# Patient Record
Sex: MALE | URBAN - METROPOLITAN AREA
[De-identification: names, ages, dates, MRNs, and addresses within clinical notes are randomized per-mention and may not be internally consistent; named-entity substitution may affect disease eponyms.]

---

## 2023-06-28 ASSESSMENT — SLEEP AND FATIGUE QUESTIONNAIRES
HOW LIKELY ARE YOU TO NOD OFF OR FALL ASLEEP WHILE SITTING AND READING: MODERATE CHANCE OF DOZING
HOW LIKELY ARE YOU TO NOD OFF OR FALL ASLEEP IN A CAR, WHILE STOPPED FOR A FEW MINUTES IN TRAFFIC: SLIGHT CHANCE OF DOZING
HOW LIKELY ARE YOU TO NOD OFF OR FALL ASLEEP WHILE LYING DOWN TO REST IN THE AFTERNOON WHEN CIRCUMSTANCES PERMIT: MODERATE CHANCE OF DOZING
HOW LIKELY ARE YOU TO NOD OFF OR FALL ASLEEP WHILE SITTING AND TALKING TO SOMEONE: SLIGHT CHANCE OF DOZING
HOW LIKELY ARE YOU TO NOD OFF OR FALL ASLEEP WHILE WATCHING TV: SLIGHT CHANCE OF DOZING
HOW LIKELY ARE YOU TO NOD OFF OR FALL ASLEEP WHEN YOU ARE A PASSENGER IN A CAR FOR AN HOUR WITHOUT A BREAK: SLIGHT CHANCE OF DOZING
HOW LIKELY ARE YOU TO NOD OFF OR FALL ASLEEP WHILE SITTING QUIETLY AFTER LUNCH WITHOUT ALCOHOL: HIGH CHANCE OF DOZING
HOW LIKELY ARE YOU TO NOD OFF OR FALL ASLEEP WHILE SITTING INACTIVE IN A PUBLIC PLACE: MODERATE CHANCE OF DOZING

## 2023-06-29 ENCOUNTER — OFFICE VISIT (OUTPATIENT)
Dept: SLEEP MEDICINE | Facility: CLINIC | Age: 28
End: 2023-06-29
Payer: COMMERCIAL

## 2023-06-29 VITALS
BODY MASS INDEX: 28.87 KG/M2 | HEIGHT: 75 IN | HEART RATE: 72 BPM | SYSTOLIC BLOOD PRESSURE: 135 MMHG | OXYGEN SATURATION: 100 % | DIASTOLIC BLOOD PRESSURE: 85 MMHG | WEIGHT: 232.2 LBS

## 2023-06-29 DIAGNOSIS — R29.818 SUSPECTED SLEEP APNEA: Primary | ICD-10-CM

## 2023-06-29 DIAGNOSIS — R40.0 SLEEPINESS: ICD-10-CM

## 2023-06-29 DIAGNOSIS — R06.83 SNORING: ICD-10-CM

## 2023-06-29 PROCEDURE — 99203 OFFICE O/P NEW LOW 30 MIN: CPT | Performed by: INTERNAL MEDICINE

## 2023-06-29 RX ORDER — CETIRIZINE HYDROCHLORIDE 10 MG/1
TABLET ORAL DAILY
COMMUNITY

## 2023-06-29 NOTE — NURSING NOTE
"Chief Complaint   Patient presents with     Sleep Problem     Snoring, stops breathing , dry mouth       Initial /85   Pulse 72   Ht 1.905 m (6' 3\")   Wt 105.3 kg (232 lb 3.2 oz)   SpO2 100%   BMI 29.02 kg/m   Estimated body mass index is 29.02 kg/m  as calculated from the following:    Height as of this encounter: 1.905 m (6' 3\").    Weight as of this encounter: 105.3 kg (232 lb 3.2 oz).    Medication Reconciliation: complete  ESS 13  Neck circumference:43  centimeters.  Barbara Rojas MA      "

## 2023-06-29 NOTE — PROGRESS NOTES
Sleep Consultation:    Date on this visit: 6/29/2023    Felipe John  is referred by No ref. provider found for a sleep consultation.     Primary Physician: No primary care provider on file.     Felipe John reports nightly loud snoring for more than 10 years.     He does not have any medical comorbidity.     Felipe does snore frequently. Patient's bed partner does complain of snoring and poor quality of sleep. He has been told in the past that he has witnessed apneas.They occasionally sleep separately.  Patient sleeps on his side and stomach. He has occasional morning headaches and frequent morning dry mouth, denies no restless legs.    Felipe goes to sleep at 10:30 PM during the week. He wakes up at 6:30 AM. He falls asleep in 15-30 minutes.  Felipe denies difficulty falling asleep.  He wakes up 0-1 times a night for 5 minutes before falling back to sleep.  Felipe wakes up to go to the bathroom and uncertain reasons.  On weekends, Felipe goes to sleep at 11:00 PM.  He wakes up at 8:00 AM. He falls asleep in 30 minutes.  Patient gets an average of 7-8 hours of sleep per night.     Felipe has occasional sleep talking and denies any sleep walking, dream enactment, sleep paralysis, cataplexy and hypnogogic/hypnopompic hallucinations. There is a remote history of sleep walking.     Felipe has difficulty breathing through his nose.      Patient's Windsor Sleepiness score 13/24 consistent with mild daytime sleepiness.      Felipe naps 0 times per week. He takes some inadvertant naps.  He denies closing eyes, dozing and falling asleep while driving. Patient was counseled on the importance of driving while alert, to pull over if drowsy, or nap before getting into the vehicle if sleepy.      He uses 1 cups/day of coffee. Last caffeine intake is usually before 8 AM.    Social History     Tobacco Use     Smoking status: Never     Smokeless tobacco: Current     Types: Snuff       Physical  "Examination:  Vitals: /85   Pulse 72   Ht 1.905 m (6' 3\")   Wt 105.3 kg (232 lb 3.2 oz)   SpO2 100%   BMI 29.02 kg/m    BMI= Body mass index is 29.02 kg/m .  GENERAL APPEARANCE: healthy, alert and no distress  HENT: oropharynx crowded  NEURO: mentation intact and speech normal  PSYCH: mentation appears normal and affect normal/bright  Mallampati Class: IV.  Tonsillar Stage: 1  hidden by pillars.    Impression/Plan:    1. To rule out obstructive sleep apnea    Patient is a 28 years old male, with a BMI of 29, with no significant medical comorbidity, who presents with a history of loud snoring, nonrestorative sleep and daytime sleepiness.  Oropharynx is Mallampati class IV on examination.  There is an intermediate risk for obstructive sleep apnea, and an overnight sleep study is recommended for further evaluation.    Plan:     1.  Home sleep apnea testing    He will follow up with me in approximately two weeks after his sleep study has been competed to review the results and discuss plan of care.       Polysomnography & HST reviewed.  Obstructive sleep apnea reviewed.  Complications of untreated sleep apnea were reviewed.    I spent a total of 30 minutes for this appointment on this date of service which include time spent before, during and after the visit for chart review, patient care, counseling and coordination of care.    Dr. Alexx Avelar       CC: No ref. provider found            "

## 2023-06-29 NOTE — PATIENT INSTRUCTIONS
[facility]  Search engine: https://mms.aadsm.org/members/directory/search_bootstrap.php?org_id=ADSM&   Certified in Dental Sleep Medicine    Casey Baum  Degree: DDS  [facility]  [address]  [address]  [place] [address]  Professional Phone: [phone]: [phone]    Priti Sparks  [facility]  [address]  [place] [address]  Professional Phone: [phone]  Website: [email or web address]      Jose Espino  Degree: DDS  [address]  [address]  [place] [address]  Professional Phone: [phone]  Fax: [phone]    Josué Bonilla  Degree: DDS  [address]  [address]  [place] [address]  Professional Phone: [phone]  Website: [email or web address]    Joseph Swanson  Degree: DDS  [facility]  [address]  [place] [address]  Professional Phone: [phone]  Website: [email or web address]      [facility]-you should verify insurance coverage  [address]  [address]  [place] [address]  Professional Phone: [phone]  Website: [email or web address]      Rita Pineda--DOES NOT ACCEPT INSURANCE  Degree: DDS--you should verify insurance coverage  [facility]  [address]  [address]  [place] [address]  Professional Phone: [phone]     Bev Kwong  Degree: DDS, PhD  [facility]  [address] [address] [place] [address] [address] [place] [address]   [address],   [address]   [place] [address]   Appointments: [phone]   Fax: [phone]   [facility]   [address] [place] [address]   [address]   [place] [address]   Appointments: [phone]   Fax: [phone]                José  Bassam  Degree: LEIGHA  [address] [place] [address]  Professional Phone: [phone]  Fax: [phone]  Website: [email or web address]      Du Khan  Degree: DDS  [facility]  [address] [place] [address]    Mariona Mulet Pradera  Degree: LEIGHA, MS  [facility]  [address] [place] [address]  Professional Phone: [phone]      Randi Merino  Degree: LEIGHA, MS  [facility]  [address]  [address]  [place] [address]  Professional Phone: [phone]    Melissa Macias  Degree: JILLIANS  [facility]  [address]  [address]  [place] [address]  [place] Office     Cody Evangelista  Degree: LEIGHA  [facility]  [address] [place] [address]  Professional Phone: [phone]  Website: [email or web address]      Daljit White  Degree: LEIGHA  [facility]  [address] [place] [address]  Professional Phone: [phone]  Fax: [phone]      Rohit Coyle  Degree: LEIGHA  [facility]  [address]  [address]  [place] [address]    Allan Xavier   Degree: LEIGHA   [facility]   [address]   [address]  [place] [address]  Phone [phone]  Website: [email or web address]                 ACCEPT MEDICARE  Joel Hunter DDS  [address], [place] [address]  [phone]; [phone] (fax)  [email or web address]    Jhonatan Fabian DDS, MS   [facility]   [address]   [address]   [place] [address]   Appointments: [phone]   Fax: [phone]     Andrade Banegas DDS   [address]   [place] [address]  Appointments [phone]      ADDITIONAL PROVIDERS    Georges Delaney DDS   [address] [place],   [address]   [place] [address]   Appointments: [phone]   Fax: [phone]       Damon Avelar DDS, [facility]  Professional Building  [address] [place] [address]   Appointments: [phone] Ext: [phone]  Fax: [phone]   [email or web address]

## 2023-08-13 ENCOUNTER — HEALTH MAINTENANCE LETTER (OUTPATIENT)
Age: 28
End: 2023-08-13

## 2024-10-06 ENCOUNTER — HEALTH MAINTENANCE LETTER (OUTPATIENT)
Age: 29
End: 2024-10-06